# Patient Record
Sex: FEMALE | Race: WHITE | URBAN - METROPOLITAN AREA
[De-identification: names, ages, dates, MRNs, and addresses within clinical notes are randomized per-mention and may not be internally consistent; named-entity substitution may affect disease eponyms.]

---

## 2018-03-01 ENCOUNTER — HOSPITAL ENCOUNTER (OUTPATIENT)
Dept: OTHER | Age: 43
Discharge: HOME | End: 2018-03-01
Attending: FAMILY MEDICINE | Admitting: CLINIC/CENTER

## 2018-03-03 LAB
CULTURE: NORMAL
REPORT STATUS: NORMAL
REQUEST PROBLEM: NORMAL
SPECIMEN: NORMAL

## 2021-01-09 ENCOUNTER — OFFICE VISIT (OUTPATIENT)
Dept: FAMILY MEDICINE CLINIC | Age: 46
End: 2021-01-09
Payer: COMMERCIAL

## 2021-01-09 VITALS
WEIGHT: 140 LBS | HEART RATE: 77 BPM | DIASTOLIC BLOOD PRESSURE: 76 MMHG | HEIGHT: 62 IN | TEMPERATURE: 97.2 F | OXYGEN SATURATION: 99 % | SYSTOLIC BLOOD PRESSURE: 124 MMHG | RESPIRATION RATE: 16 BRPM | BODY MASS INDEX: 25.76 KG/M2

## 2021-01-09 DIAGNOSIS — J06.9 UPPER RESPIRATORY TRACT INFECTION, UNSPECIFIED TYPE: Primary | ICD-10-CM

## 2021-01-09 PROCEDURE — 99213 OFFICE O/P EST LOW 20 MIN: CPT | Performed by: NURSE PRACTITIONER

## 2021-01-09 RX ORDER — AZITHROMYCIN 250 MG/1
250 TABLET, FILM COATED ORAL DAILY
Qty: 10 TABLET | Refills: 0 | Status: SHIPPED | OUTPATIENT
Start: 2021-01-09 | End: 2021-01-19

## 2021-01-09 RX ORDER — FLUTICASONE PROPIONATE 50 MCG
1 SPRAY, SUSPENSION (ML) NASAL DAILY
Qty: 1 BOTTLE | Refills: 0 | Status: SHIPPED | OUTPATIENT
Start: 2021-01-09

## 2021-01-09 RX ORDER — LORATADINE AND PSEUDOEPHEDRINE SULFATE 10; 240 MG/1; MG/1
1 TABLET, EXTENDED RELEASE ORAL DAILY
Qty: 30 TABLET | Refills: 0 | Status: SHIPPED | OUTPATIENT
Start: 2021-01-09 | End: 2021-02-08

## 2021-01-09 RX ORDER — DESOGESTREL AND ETHINYL ESTRADIOL 21-5 (28)
KIT ORAL
COMMUNITY
Start: 2020-12-16 | End: 2021-08-11 | Stop reason: SDUPTHER

## 2021-01-09 NOTE — PROGRESS NOTES
Subjective:       History was provided by the patient. Merrill Garcia is a 50 y.o. female who presents for evaluation of symptoms of a URI. Symptoms include nasal blockage, post nasal drip, sinus and nasal congestion and sore throat. Onset of symptoms was 5 days ago, unchanged since that time. Associated symptoms include congestion, nasal congestion, no  fever, post nasal drip and sore throat. She is drinking plenty of fluids. Evaluation to date: none. Treatment to date: none  Past Medical History:   Diagnosis Date    Broken nose     Headache      There are no active problems to display for this patient. No past surgical history on file. Review of Systems  Pertinent items are noted in HPI. Objective:      General appearance: alert, appears stated age and cooperative  Head: Normocephalic, without obvious abnormality, atraumatic  Eyes: conjunctivae/corneas clear. PERRL, EOM's intact. Fundi benign. Ears: normal TM's and external ear canals both ears  Nose: Nares normal. Septum midline. Mucosa normal. No drainage or sinus tenderness. Throat: abnormal findings: marked oropharyngeal erythema  Lungs: clear to auscultation bilaterally  Lymph nodes: Cervical, supraclavicular, and axillary nodes normal.         Assessment:      viral upper respiratory illness      Plan:      Discussed dx and tx of URIs  Suggested symptomatic OTC remedies. Suggested brief course of Afrin or similar. Nasal saline sprays for congestion. Nasal steroids per orders. RTC prn.      1. Upper respiratory tract infection, unspecified type    Pt feels that she constantly has to clear her throat. Has tried no treatment to date. She states that her nose feels sticky, is unable to cough anything up.    - azithromycin (ZITHROMAX) 250 MG tablet; Take 1 tablet by mouth daily for 10 days  Dispense: 10 tablet; Refill: 0  - fluticasone (FLONASE) 50 MCG/ACT nasal spray; 1 spray by Each Nostril route daily  Dispense: 1 Bottle;  Refill: 0  - loratadine-pseudoephedrine (CLARITIN-D 24 HOUR)  MG per extended release tablet; Take 1 tablet by mouth daily  Dispense: 30 tablet;  Refill: 0

## 2021-01-09 NOTE — PATIENT INSTRUCTIONS
Thank you for enrolling in [address] Please follow the instructions below to securely access your online medical record. [employer] allows you to send messages to your doctor, view your test results, renew your prescriptions, schedule appointments, and more. How Do I Sign Up? 1. In your Internet browser, go to [email or web address]  2. Click on the Sign Up Now link in the Sign In box. You will see the New Member Sign Up page. 3. Enter your [employer] Access Code exactly as it appears below. You will not need to use this code after youve completed the sign-up process. If you do not sign up before the expiration date, you must request a new code. [employer] Access Code: Activation code not generated  Current [employer] Status: Patient Declined    4. Enter your Social Security Number ([id number]) and Date of Birth (mm/dd/yyyy) as indicated and click Submit. You will be taken to the next sign-up page. 5. Create a [employer] ID. This will be your [employer] login ID and cannot be changed, so think of one that is secure and easy to remember. 6. Create a [employer] password. You can change your password at any time. 7. Enter your Password Reset Question and Answer. This can be used at a later time if you forget your password. 8. Enter your e-mail address. You will receive e-mail notification when new information is available in [address] 9. Click Sign Up. You can now view your medical record. Additional Information  If you have questions, please contact the physician practice where you receive care. Remember, [employer] is NOT to be used for urgent needs. For medical emergencies, dial 911. For questions regarding your [employer] account call [phone]. If you have a clinical question, please call your doctor's office.

## 2021-08-11 DIAGNOSIS — J06.9 UPPER RESPIRATORY TRACT INFECTION, UNSPECIFIED TYPE: ICD-10-CM

## 2021-08-11 RX ORDER — DESOGESTREL AND ETHINYL ESTRADIOL 21-5 (28)
1 KIT ORAL DAILY
Qty: 1 PACKET | Refills: 0 | Status: SHIPPED | OUTPATIENT
Start: 2021-08-11 | End: 2021-09-03 | Stop reason: SDUPTHER

## 2021-09-03 ENCOUNTER — OFFICE VISIT (OUTPATIENT)
Dept: OBGYN | Age: 46
End: 2021-09-03
Payer: COMMERCIAL

## 2021-09-03 ENCOUNTER — HOSPITAL ENCOUNTER (OUTPATIENT)
Age: 46
Setting detail: SPECIMEN
Discharge: HOME | End: 2021-09-03
Payer: COMMERCIAL

## 2021-09-03 VITALS — BODY MASS INDEX: 23.92 KG/M2 | WEIGHT: 130 LBS | HEIGHT: 62 IN

## 2021-09-03 DIAGNOSIS — Z12.31 ENCOUNTER FOR SCREENING MAMMOGRAM FOR MALIGNANT NEOPLASM OF BREAST: ICD-10-CM

## 2021-09-03 DIAGNOSIS — J06.9 UPPER RESPIRATORY TRACT INFECTION, UNSPECIFIED TYPE: ICD-10-CM

## 2021-09-03 DIAGNOSIS — Z80.41 FAMILY HISTORY OF OVARIAN CANCER: ICD-10-CM

## 2021-09-03 DIAGNOSIS — Z01.419 WOMEN'S ANNUAL ROUTINE GYNECOLOGICAL EXAMINATION: Primary | ICD-10-CM

## 2021-09-03 DIAGNOSIS — Z87.42 HISTORY OF ABNORMAL CERVICAL PAP SMEAR: ICD-10-CM

## 2021-09-03 PROCEDURE — 99396 PREV VISIT EST AGE 40-64: CPT | Performed by: NURSE PRACTITIONER

## 2021-09-03 PROCEDURE — 87624 HPV HI-RISK TYP POOLED RSLT: CPT

## 2021-09-03 PROCEDURE — 88142 CYTOPATH C/V THIN LAYER: CPT

## 2021-09-03 RX ORDER — DESOGESTREL AND ETHINYL ESTRADIOL 21-5 (28)
1 KIT ORAL DAILY
Qty: 1 PACKET | Refills: 11 | Status: SHIPPED | OUTPATIENT
Start: 2021-09-03 | End: 2022-09-07 | Stop reason: SDUPTHER

## 2021-09-03 SDOH — ECONOMIC STABILITY: FOOD INSECURITY: WITHIN THE PAST 12 MONTHS, THE FOOD YOU BOUGHT JUST DIDN'T LAST AND YOU DIDN'T HAVE MONEY TO GET MORE.: NEVER TRUE

## 2021-09-03 SDOH — ECONOMIC STABILITY: FOOD INSECURITY: WITHIN THE PAST 12 MONTHS, YOU WORRIED THAT YOUR FOOD WOULD RUN OUT BEFORE YOU GOT MONEY TO BUY MORE.: NEVER TRUE

## 2021-09-03 ASSESSMENT — PATIENT HEALTH QUESTIONNAIRE - PHQ9
SUM OF ALL RESPONSES TO PHQ QUESTIONS 1-9: 0
SUM OF ALL RESPONSES TO PHQ QUESTIONS 1-9: 0
SUM OF ALL RESPONSES TO PHQ9 QUESTIONS 1 & 2: 0
SUM OF ALL RESPONSES TO PHQ QUESTIONS 1-9: 0
2. FEELING DOWN, DEPRESSED OR HOPELESS: 0
1. LITTLE INTEREST OR PLEASURE IN DOING THINGS: 0

## 2021-09-03 ASSESSMENT — ENCOUNTER SYMPTOMS
GASTROINTESTINAL NEGATIVE: 1
RESPIRATORY NEGATIVE: 1

## 2021-09-03 ASSESSMENT — SOCIAL DETERMINANTS OF HEALTH (SDOH): HOW HARD IS IT FOR YOU TO PAY FOR THE VERY BASICS LIKE FOOD, HOUSING, MEDICAL CARE, AND HEATING?: NOT HARD AT ALL

## 2021-09-03 NOTE — PROGRESS NOTES
9/3/21    Lizet Barba  1975    No chief complaint on file. The patient is a 48 y.o. female, No obstetric history on file. who presents for her annual exam.  She is menstruating normally. She is  sexually active. She is currently taking birth control. Birth control method is oral contraceptive. She reports no gynecological symptoms. Pap smear history: Her last PAP smear was in 10/27/20. Her results were normal.    Breast history: her most recent mammogram was in \"years ago\". The results were: Normal per patient    Past Medical History:   Diagnosis Date    Broken nose     Headache        No past surgical history on file. No family history on file. Social History     Tobacco Use    Smoking status: Never Smoker    Smokeless tobacco: Never Used   Substance Use Topics    Alcohol use: No    Drug use: No       Current Outpatient Medications   Medication Sig Dispense Refill    VOLNEA 0.15-0.02/0.01 MG (21/5) per tablet Take 1 tablet by mouth daily 1 packet 0    fluticasone (FLONASE) 50 MCG/ACT nasal spray 1 spray by Each Nostril route daily 1 Bottle 0     No current facility-administered medications for this visit. No Known Allergies    No obstetric history on file. There is no immunization history on file for this patient. Review of Systems   Constitutional: Negative. Respiratory: Negative. Gastrointestinal: Negative. Genitourinary: Negative. There were no vitals taken for this visit. Physical Exam  Vitals reviewed. Constitutional:       Appearance: Normal appearance. HENT:      Head: Normocephalic. Pulmonary:      Effort: Pulmonary effort is normal.   Chest:      Breasts:         Right: Normal.         Left: Normal.   Abdominal:      Palpations: Abdomen is soft. Genitourinary:     General: Normal vulva.       Vagina: Normal.      Cervix: Normal.      Uterus: Normal.       Adnexa: Right adnexa normal and left adnexa normal.      Rectum: Normal.   Musculoskeletal:         General: Normal range of motion. Cervical back: Normal range of motion. Skin:     General: Skin is warm and dry. Neurological:      Mental Status: She is alert. Psychiatric:         Mood and Affect: Mood normal.         Behavior: Behavior normal.         No results found for this visit on 09/03/21. Assessment and Plan   Diagnosis Orders   1. Women's annual routine gynecological examination     2. Encounter for screening mammogram for malignant neoplasm of breast       /68  2/25/19 LGSIL + HPV, [place] 3/17/20, Cryo 3/31/20  Family history of ovarian cancer; mother age 28. Info given on genetic counseling. No follow-ups on file.     Grady Dear, APRN - CNP

## 2021-09-10 LAB
HPV HIGH RISK: NOT DETECTED
HPV, GENOTYPE 16: NOT DETECTED
HPV, GENOTYPE 18: NOT DETECTED

## 2022-05-06 ENCOUNTER — OFFICE VISIT (OUTPATIENT)
Dept: FAMILY MEDICINE CLINIC | Age: 47
End: 2022-05-06
Payer: COMMERCIAL

## 2022-05-06 VITALS
TEMPERATURE: 97.3 F | HEART RATE: 70 BPM | RESPIRATION RATE: 14 BRPM | DIASTOLIC BLOOD PRESSURE: 72 MMHG | SYSTOLIC BLOOD PRESSURE: 120 MMHG | WEIGHT: 132.2 LBS | BODY MASS INDEX: 24.18 KG/M2 | OXYGEN SATURATION: 99 %

## 2022-05-06 DIAGNOSIS — N31.9 BLADDER DYSFUNCTION: ICD-10-CM

## 2022-05-06 DIAGNOSIS — Z32.02 PREGNANCY EXAMINATION OR TEST, NEGATIVE RESULT: ICD-10-CM

## 2022-05-06 DIAGNOSIS — N89.8 VAGINAL DISCHARGE: ICD-10-CM

## 2022-05-06 DIAGNOSIS — B37.31 VAGINAL YEAST INFECTION: Primary | ICD-10-CM

## 2022-05-06 LAB
CONTROL: NORMAL
PREGNANCY TEST URINE, POC: NEGATIVE

## 2022-05-06 PROCEDURE — 81025 URINE PREGNANCY TEST: CPT | Performed by: FAMILY MEDICINE

## 2022-05-06 PROCEDURE — 99204 OFFICE O/P NEW MOD 45 MIN: CPT | Performed by: FAMILY MEDICINE

## 2022-05-06 RX ORDER — OXYBUTYNIN CHLORIDE 5 MG/1
10 TABLET, EXTENDED RELEASE ORAL DAILY
Qty: 60 TABLET | Refills: 0 | Status: SHIPPED | OUTPATIENT
Start: 2022-05-06 | End: 2022-06-02

## 2022-05-06 RX ORDER — CEPHALEXIN 500 MG/1
500 CAPSULE ORAL 2 TIMES DAILY
COMMUNITY
Start: 2022-04-30

## 2022-05-06 RX ORDER — FLUCONAZOLE 150 MG/1
150 TABLET ORAL ONCE
Qty: 1 TABLET | Refills: 0 | Status: SHIPPED | OUTPATIENT
Start: 2022-05-06 | End: 2022-05-06

## 2022-05-06 ASSESSMENT — PATIENT HEALTH QUESTIONNAIRE - PHQ9
SUM OF ALL RESPONSES TO PHQ QUESTIONS 1-9: 0
SUM OF ALL RESPONSES TO PHQ QUESTIONS 1-9: 0
1. LITTLE INTEREST OR PLEASURE IN DOING THINGS: 0
SUM OF ALL RESPONSES TO PHQ QUESTIONS 1-9: 0
SUM OF ALL RESPONSES TO PHQ9 QUESTIONS 1 & 2: 0
2. FEELING DOWN, DEPRESSED OR HOPELESS: 0
SUM OF ALL RESPONSES TO PHQ QUESTIONS 1-9: 0

## 2022-05-06 NOTE — PROGRESS NOTES
[facility]  [address]  [facility] [place] [address]  Dept: [phone]  Loc: [phone]        ASSESSMENT/PLAN:    1. Vaginal yeast infection  -     fluconazole (DIFLUCAN) 150 MG tablet; Take 1 tablet by mouth once for 1 dose, Disp-1 tablet, R-0Normal  -     C.trachomatis N.gonorrhoeae DNA; Future  2. Vaginal discharge  -     LIPID PANEL; Future  -     RPR REFLEX; Future  3. Bladder dysfunction  -     oxybutynin (DITROPAN XL) 5 MG extended release tablet; Take 2 tablets by mouth daily, Disp-60 tablet, R-0Normal  Reports abnormal white vaginal discharge after starting Keflex. Patient is agreeable to get lab work done and take medication as prescribed  Additionally, , patient reports some episodes of incontinence. Patient reports that she feels a lot of pressure and sometimes unable to hold her urine. Patient denies any work-up in the past    Return if symptoms worsen or fail to improve. Patient's Name: Justice Parmar   YOB: 1975   Age: 48 y.o. Date of service: 5/6/2022    Chief Complaint:   Chief Complaint   Patient presents with   [address]     patient is here to establish care     Incontinence     having issues with incontinence she is currently being treated for a UTI    Discuss Labs     would like to have a lipid panel     Yeast Infection     having vaginal itching since she started taking the cephalexin having some swelling         Patient ID: Justice Parmar is a 48 y.o. female.    Chief Complaint   Patient presents with   [address]     patient is here to establish care     Incontinence     having issues with incontinence she is currently being treated for a UTI    Discuss Labs     would like to have a lipid panel     Yeast Infection     having vaginal itching since she started taking the cephalexin having some swelling        HPI    Patient is a 68-year-old female with no  Smokeless tobacco: Never Used   Substance and Sexual Activity    Alcohol use: No    Drug use: No    Sexual activity: Not on file   Other Topics Concern    Not on file   Social History Narrative    Not on file     Social Determinants of Health     Financial Resource Strain: Low Risk     Difficulty of Paying Living Expenses: Not hard at all   Food Insecurity: No Food Insecurity    Worried About Running Out of Food in the Last Year: Never true    Curtis of Food in the Last Year: Never true   Transportation Needs:     Lack of Transportation (Medical): Not on file    Lack of Transportation (Non-Medical): Not on file   Physical Activity:     Days of Exercise per Week: Not on file    Minutes of Exercise per Session: Not on file   Stress:     Feeling of Stress : Not on file   Social Connections:     Frequency of Communication with Friends and Family: Not on file    Frequency of Social Gatherings with Friends and Family: Not on file    Attends [employer] Services: Not on file    Active Member of [address] [place] [address] or Organizations: Not on file    Attends Club or Organization Meetings: Not on file    Marital Status: Not on file   Intimate Partner Violence:     Fear of Current or Ex-Partner: Not on file    Emotionally Abused: Not on file    Physically Abused: Not on file    Sexually Abused: Not on file   Housing Stability:     Unable to Pay for Housing in the Last Year: Not on file    Number of [place] in the Last Year: Not on file    Unstable Housing in the Last Year: Not on file       There is no immunization history on file for this patient.   No Known Allergies  Family History   Problem Relation Age of Onset    Ovarian Cancer Mother          Current Outpatient Medications   Medication Sig Dispense Refill    cephALEXin (KEFLEX) 500 MG capsule Take 500 mg by mouth 2 times daily      fluconazole (DIFLUCAN) 150 MG tablet Take 1 tablet by mouth once for 1 dose 1 tablet 0    oxybutynin (DITROPAN XL) 5 MG extended release tablet Take 2 tablets by mouth daily 60 tablet 0    VOLNEA 0.15-0.02/0.01 MG (21/5) per tablet Take 1 tablet by mouth daily 1 packet 11    fluticasone (FLONASE) 50 MCG/ACT nasal spray 1 spray by Each Nostril route daily (Patient not taking: Reported on 9/3/2021) 1 Bottle 0     No current facility-administered medications for this visit. Objective:  Vitals:  Vitals:    05/06/22 1035   BP: 120/72   Pulse: 70   Resp: 14   Temp: 97.3 °F (36.3 °C)   SpO2: 99%      BP Readings from Last 4 Encounters:   05/06/22 120/72   01/09/21 124/76   03/17/18 124/78   10/11/17 117/73      Body mass index is 24.18 kg/m². All questions answered to patient's satisfaction. The patient was counseled regarding impressions, instructions for management and importance of compliance with treatment. Return if symptoms worsen or fail to improve.     Nolan Strickland MD

## 2022-05-07 LAB
C. TRACHOMATIS DNA ,URINE: NEGATIVE
CANDIDA SPECIES, DNA PROBE: ABNORMAL
CHOLESTEROL, TOTAL: 312 MG/DL (ref 0–199)
GARDNERELLA VAGINALIS, DNA PROBE: ABNORMAL
HDLC SERPL-MCNC: 73 MG/DL (ref 40–60)
LDL CHOLESTEROL CALCULATED: 198 MG/DL
N. GONORRHOEAE DNA, URINE: NEGATIVE
TOTAL SYPHILLIS IGG/IGM: NORMAL
TRICHOMONAS VAGINALIS DNA: ABNORMAL
TRIGL SERPL-MCNC: 207 MG/DL (ref 0–150)
VLDLC SERPL CALC-MCNC: 41 MG/DL

## 2022-05-10 DIAGNOSIS — R53.83 OTHER FATIGUE: Primary | ICD-10-CM

## 2022-05-10 RX ORDER — FLUCONAZOLE 150 MG/1
150 TABLET ORAL ONCE
Qty: 1 TABLET | Refills: 0 | Status: SHIPPED | OUTPATIENT
Start: 2022-05-10 | End: 2022-05-10

## 2022-05-10 RX ORDER — ATORVASTATIN CALCIUM 40 MG/1
40 TABLET, FILM COATED ORAL DAILY
Qty: 30 TABLET | Refills: 3 | Status: SHIPPED | OUTPATIENT
Start: 2022-05-10 | End: 2022-09-12

## 2022-06-01 DIAGNOSIS — N31.9 BLADDER DYSFUNCTION: ICD-10-CM

## 2022-06-02 RX ORDER — OXYBUTYNIN CHLORIDE 5 MG/1
TABLET, EXTENDED RELEASE ORAL
Qty: 60 TABLET | Refills: 0 | Status: SHIPPED | OUTPATIENT
Start: 2022-06-02

## 2022-09-07 DIAGNOSIS — J06.9 UPPER RESPIRATORY TRACT INFECTION, UNSPECIFIED TYPE: ICD-10-CM

## 2022-09-07 RX ORDER — DESOGESTREL AND ETHINYL ESTRADIOL 21-5 (28)
1 KIT ORAL DAILY
Qty: 1 PACKET | Refills: 0 | Status: SHIPPED | OUTPATIENT
Start: 2022-09-07 | End: 2022-10-07

## 2022-09-09 DIAGNOSIS — J06.9 UPPER RESPIRATORY TRACT INFECTION, UNSPECIFIED TYPE: ICD-10-CM

## 2022-09-09 RX ORDER — DESOGESTREL AND ETHINYL ESTRADIOL 21-5 (28)
KIT ORAL
Qty: 28 TABLET | OUTPATIENT
Start: 2022-09-09

## 2022-09-12 RX ORDER — ATORVASTATIN CALCIUM 40 MG/1
TABLET, FILM COATED ORAL
Qty: 30 TABLET | Refills: 3 | Status: SHIPPED | OUTPATIENT
Start: 2022-09-12

## 2022-10-07 DIAGNOSIS — J06.9 UPPER RESPIRATORY TRACT INFECTION, UNSPECIFIED TYPE: ICD-10-CM

## 2022-10-07 RX ORDER — DESOGESTREL AND ETHINYL ESTRADIOL 21-5 (28)
KIT ORAL
Qty: 28 TABLET | Refills: 0 | Status: SHIPPED | OUTPATIENT
Start: 2022-10-07 | End: 2022-11-02

## 2022-11-02 ENCOUNTER — TELEPHONE (OUTPATIENT)
Dept: OBGYN | Age: 47
End: 2022-11-02

## 2022-11-02 RX ORDER — DESOGESTREL AND ETHINYL ESTRADIOL 21-5 (28)
1 KIT ORAL DAILY
Qty: 1 PACKET | Refills: 0 | Status: SHIPPED | OUTPATIENT
Start: 2022-11-02 | End: 2022-11-22 | Stop reason: SDUPTHER

## 2022-11-02 NOTE — TELEPHONE ENCOUNTER
Pt had to reschedule her annual, needs one more month of Volnea 0.15-0.02/0.01 (21/5)  Annual scheduled 11/17/2022

## 2022-11-04 RX ORDER — DESOGESTREL AND ETHINYL ESTRADIOL 21-5 (28)
KIT ORAL
Qty: 28 TABLET | OUTPATIENT
Start: 2022-11-04

## 2022-11-22 ENCOUNTER — OFFICE VISIT (OUTPATIENT)
Dept: OBGYN | Age: 47
End: 2022-11-22
Payer: COMMERCIAL

## 2022-11-22 DIAGNOSIS — N39.498 OTHER URINARY INCONTINENCE: ICD-10-CM

## 2022-11-22 DIAGNOSIS — Z01.419 WOMEN'S ANNUAL ROUTINE GYNECOLOGICAL EXAMINATION: Primary | ICD-10-CM

## 2022-11-22 DIAGNOSIS — Z12.31 ENCOUNTER FOR SCREENING MAMMOGRAM FOR MALIGNANT NEOPLASM OF BREAST: ICD-10-CM

## 2022-11-22 DIAGNOSIS — Z87.42 HISTORY OF DYSMENORRHEA: ICD-10-CM

## 2022-11-22 PROCEDURE — 99396 PREV VISIT EST AGE 40-64: CPT

## 2022-11-22 RX ORDER — DESOGESTREL AND ETHINYL ESTRADIOL 21-5 (28)
1 KIT ORAL DAILY
Qty: 3 PACKET | Refills: 3 | Status: SHIPPED | OUTPATIENT
Start: 2022-11-22

## 2022-11-22 ASSESSMENT — ENCOUNTER SYMPTOMS
ABDOMINAL PAIN: 0
RESPIRATORY NEGATIVE: 1
GASTROINTESTINAL NEGATIVE: 1

## 2022-11-22 NOTE — PROGRESS NOTES
22    Leigh Lott  1975    Chief Complaint   Patient presents with    Annual Exam     Pap 9/3/2021- Mammo-unknown. Taking bc. Needs refills. Pt c/o incontinence. The patient is a 50 y.o. female,  who presents for her annual exam.  She is menstruating normally. She is  sexually active. She is currently taking birth control. Birth control method is oral contraceptive. She reports additional symptoms of urinary incontinence . Pt states symptoms have been mildly present for years but have progressively worsened over about the last year. Pap smear history: Her last PAP smear was in . Her results were normal.    Breast history: her most recent mammogram was in unknown. The results were: Normal    Past Medical History:   Diagnosis Date    Abnormal Pap smear of cervix     LGSIL 3/17/2020, had cryo 3/21/20    Broken nose     Headache     migraine       Past Surgical History:   Procedure Laterality Date    GYNECOLOGIC CRYOSURGERY      NASAL SEPTUM SURGERY         Family History   Problem Relation Age of Onset    Ovarian Cancer Mother        Social History     Tobacco Use    Smoking status: Never    Smokeless tobacco: Never   Substance Use Topics    Alcohol use: No    Drug use: No       Current Outpatient Medications   Medication Sig Dispense Refill    desogestrel-ethinyl estradiol (VOLNEA) 0.15-0.02/0.01 MG () per tablet Take 1 tablet by mouth daily 3 packet 3    atorvastatin (LIPITOR) 40 MG tablet TAKE ONE TABLET BY MOUTH DAILY 30 tablet 3    oxybutynin (DITROPAN-XL) 5 MG extended release tablet TAKE TWO TABLETS BY MOUTH DAILY 60 tablet 0    cephALEXin (KEFLEX) 500 MG capsule Take 500 mg by mouth 2 times daily (Patient not taking: Reported on 2022)      fluticasone (FLONASE) 50 MCG/ACT nasal spray 1 spray by Each Nostril route daily (Patient not taking: No sig reported) 1 Bottle 0     No current facility-administered medications for this visit.        No Known Allergies          There is no immunization history on file for this patient. Review of Systems   Constitutional: Negative. Negative for fatigue and fever. Respiratory: Negative. Gastrointestinal: Negative. Negative for abdominal pain. Endocrine: Negative. Genitourinary: Negative. Negative for dysuria, frequency, menstrual problem, vaginal bleeding, vaginal discharge and vaginal pain. Musculoskeletal: Negative. Skin: Negative. Neurological: Negative. Negative for dizziness and headaches. Psychiatric/Behavioral: Negative. There were no vitals taken for this visit. Physical Exam  Vitals and nursing note reviewed. Constitutional:       General: She is not in acute distress. Appearance: Normal appearance. She is normal weight. HENT:      Head: Normocephalic and atraumatic. Pulmonary:      Effort: Pulmonary effort is normal. No respiratory distress. Chest:   Breasts:     Right: Normal.      Left: Normal.   Abdominal:      Palpations: Abdomen is soft. Tenderness: There is no abdominal tenderness. Genitourinary:     General: Normal vulva. Exam position: Lithotomy position. Vagina: Normal.      Cervix: Normal.      Uterus: Normal.       Adnexa: Right adnexa normal and left adnexa normal.   Musculoskeletal:         General: Normal range of motion. Cervical back: Normal range of motion. Lymphadenopathy:      Upper Body:      Right upper body: No supraclavicular or axillary adenopathy. Left upper body: No supraclavicular or axillary adenopathy. Skin:     General: Skin is warm and dry. Findings: No rash. Neurological:      General: No focal deficit present. Mental Status: She is alert and oriented to person, place, and time. Psychiatric:         Mood and Affect: Mood normal.         Behavior: Behavior normal.         Thought Content: Thought content normal.       No results found for this visit on 22.     Assessment and Plan Diagnosis Orders   1. Women's annual routine gynecological examination  WILNER DIGITAL SCREEN W OR WO CAD BILATERAL      2. Other urinary incontinence  External Referral To Physical Therapy      3. Encounter for screening mammogram for malignant neoplasm of breast  [facility] DIGITAL SCREEN W OR WO CAD BILATERAL      4. History of dysmenorrhea  desogestrel-ethinyl estradiol (VOLNEA) 0.15-0.02/0.01 MG (21/5) per tablet        Mammo order, pelvic floor PT discussed and external referral placed, refill OCP    Pt encouraged to call/return if urinary issues persist, reviewed possibility of medication trial at some point in the future as well    No other issues today    Return if symptoms worsen or fail to improve.     Caryle Sprung, PA-C

## 2022-11-27 DIAGNOSIS — Z87.42 HISTORY OF DYSMENORRHEA: ICD-10-CM

## 2022-11-28 RX ORDER — DESOGESTREL AND ETHINYL ESTRADIOL 21-5 (28)
KIT ORAL
Qty: 28 TABLET | OUTPATIENT
Start: 2022-11-28

## 2022-12-12 ENCOUNTER — OFFICE VISIT (OUTPATIENT)
Dept: INTERNAL MEDICINE CLINIC | Age: 47
End: 2022-12-12
Payer: COMMERCIAL

## 2022-12-12 VITALS
BODY MASS INDEX: 27.05 KG/M2 | TEMPERATURE: 97.5 F | RESPIRATION RATE: 16 BRPM | SYSTOLIC BLOOD PRESSURE: 118 MMHG | HEART RATE: 76 BPM | WEIGHT: 147 LBS | HEIGHT: 62 IN | DIASTOLIC BLOOD PRESSURE: 78 MMHG | OXYGEN SATURATION: 99 %

## 2022-12-12 DIAGNOSIS — R07.9 CHEST PAIN, UNSPECIFIED TYPE: Primary | ICD-10-CM

## 2022-12-12 DIAGNOSIS — Z30.41 FAMILY PLANNING, BCP (BIRTH CONTROL PILLS) MAINTENANCE: ICD-10-CM

## 2022-12-12 DIAGNOSIS — N39.41 URGE INCONTINENCE: ICD-10-CM

## 2022-12-12 DIAGNOSIS — R53.82 CHRONIC FATIGUE: ICD-10-CM

## 2022-12-12 DIAGNOSIS — E78.2 MIXED HYPERLIPIDEMIA: ICD-10-CM

## 2022-12-12 DIAGNOSIS — N39.46 MIXED INCONTINENCE: ICD-10-CM

## 2022-12-12 PROCEDURE — 99203 OFFICE O/P NEW LOW 30 MIN: CPT | Performed by: INTERNAL MEDICINE

## 2022-12-12 RX ORDER — BUTALBITAL, ACETAMINOPHEN AND CAFFEINE 300; 40; 50 MG/1; MG/1; MG/1
CAPSULE ORAL
COMMUNITY
Start: 2022-11-01

## 2022-12-12 ASSESSMENT — ENCOUNTER SYMPTOMS
GASTROINTESTINAL NEGATIVE: 1
WHEEZING: 0
COUGH: 0
ALLERGIC/IMMUNOLOGIC NEGATIVE: 1
SHORTNESS OF BREATH: 0
EYES NEGATIVE: 1
RESPIRATORY NEGATIVE: 1

## 2022-12-12 NOTE — PROGRESS NOTES
Alicia Samayoa  Patient's  is 1975  Seen in office on 2022      SUBJECTIVE:  Lyle Mckeon angie 50 y. o.year old female presents today   Chief Complaint   Patient presents with    New Patient     Establish care and concerns with sharp chest pain    Hyperlipidemia     Concerns with cholesterol and wonders if medication is working    Fatigue     Feeling fatigued daily. Sleeps between 6-7 hours a night with disrupted sleep due to weak bladder. Denies difficulty falling asleep     New patient here to establish    Patient complaining of sharp chest pain on the left side lasting only 1 to 2 seconds. No shortness of breath with that. No cough or sputum production. No fever or chills    Patient gives a history of very high lipids. Cholesterol was 300 and and the LDL was 192. Patient was started on atorvastatin 40 mg daily by previous doctor but no follow-up blood tests were done here. Patient denies any abdominal pain. No myalgias    Complains of fatigue for the last 1 year. Dr Kasia Goodwin had ordered iron studies and CBC and TSH but patient did not get that done    She also has urinary incontinence she has urge incontinence and also stress incontinence when she coughs or sneezes. Taking medications regularly. No side effects noted. Review of Systems   Constitutional:  Positive for fatigue. Negative for chills, diaphoresis and fever. HENT: Negative. Eyes: Negative. Respiratory: Negative. Negative for cough, shortness of breath and wheezing. Cardiovascular:  Negative for palpitations and leg swelling. Gastrointestinal: Negative. Endocrine: Negative. Genitourinary:  Positive for urgency. Musculoskeletal: Negative. Allergic/Immunologic: Negative. Neurological: Negative. Hematological: Negative. Psychiatric/Behavioral: Negative.        OBJECTIVE: /78 (Site: Left Upper Arm, Position: Sitting, Cuff Size: Medium Adult)   Pulse 76   Temp 97.5 °F (36.4 °C) (Temporal)   Resp 16 Ht 5' 2\" (1.575 m)   Wt 147 lb (66.7 kg)   SpO2 99%   BMI 26.89 kg/m²     Wt Readings from Last 3 Encounters:   12/12/22 147 lb (66.7 kg)   05/06/22 132 lb 3.2 oz (60 kg)   09/03/21 130 lb (59 kg)      GENERAL: - Alert, oriented, pleasant, in no apparent distress. HEENT: - Conjunctiva pink, no scleral icterus. ENT clear. NECK: -Supple. No jugular venous distention noted. No masses felt,  CARDIOVASCULAR: - Normal S1 and S2    PULMONARY: - No respiratory distress. No wheezes or rales. ABDOMEN: - Soft and non-tender,no masses  ororganomegaly. EXTREMITIES: - No cyanosis, clubbing, or significant edema. SKIN: Skin is warm and dry. NEUROLOGICAL: - Cranial nerves II through XII are grossly intact. IMPRESSION:    Encounter Diagnoses   Name Primary? Mixed hyperlipidemia     Chronic fatigue     Urge incontinence     Mixed incontinence     Family planning, BCP (birth control pills) maintenance        ASSESSMENT/PLAN:    1. Chest pain, unspecified type   Sharp chest pain lasting 1 to 2 seconds. Examination normal   Will get a chest x-ray    -     XR CHEST (2 VW); Future  2. Mixed hyperlipidemia  Overview:  Pt has HLD since teenage   Chol 312 recently   On atorvastatin 40 mg daily   Orders:  -     Lipid, Fasting  -     Vitamin D 25 Hydroxy; Future  3. Chronic fatigue  Overview:  Fatigue for one year   Orders:  -     CBC with Auto Differential  -     Lipid, Fasting  -     Comprehensive Metabolic Panel  -     TSH  4. Mixed incontinence. Continue oxybutynin    6. Family planning, BCP (birth control pills) maintenance    Orders Placed This Encounter   Procedures    XR CHEST (2 VW)    CBC with Auto Differential    Lipid, Fasting    Comprehensive Metabolic Panel    TSH    Vitamin D 25 Hydroxy       Mediations reviewed with the patient. Continue current medications. Appropriate prescriptions are addressed. After visit summeryprovided. Follow up as directed sooner if needed.   Questions answered and patient verbalizes understanding. Call for any problems, questions, or concerns. No Known Allergies  Current Outpatient Medications   Medication Sig Dispense Refill    butalbital-APAP-caffeine -40 MG CAPS per capsule       desogestrel-ethinyl estradiol (VOLNEA) 0.15-0.02/0.01 MG (21/5) per tablet Take 1 tablet by mouth daily 3 packet 3    atorvastatin (LIPITOR) 40 MG tablet TAKE ONE TABLET BY MOUTH DAILY 30 tablet 3    oxybutynin (DITROPAN-XL) 5 MG extended release tablet TAKE TWO TABLETS BY MOUTH DAILY 60 tablet 0     No current facility-administered medications for this visit.      Past Medical History:   Diagnosis Date    Abnormal Pap smear of cervix     LGSIL 3/17/2020, had cryo 3/21/20    Broken nose     Headache     migraine    Urinary incontinence      Past Surgical History:   Procedure Laterality Date    GYNECOLOGIC CRYOSURGERY      NASAL SEPTUM SURGERY       Social History     Tobacco Use    Smoking status: Never    Smokeless tobacco: Never   Substance Use Topics    Alcohol use: No       LAB REVIEW:  CBC:   Lab Results   Component Value Date/Time    WBC 11.4 10/11/2017 02:15 PM    HGB 13.3 10/11/2017 02:15 PM    HCT 39.9 10/11/2017 02:15 PM     10/11/2017 02:15 PM     Lipids:   Lab Results   Component Value Date    HDL 73 (H) 05/06/2022    LDLCALC 198 (H) 05/06/2022     Renal:   Lab Results   Component Value Date/Time    BUN 6 10/11/2017 02:15 PM    CREATININE 0.6 10/11/2017 02:15 PM     10/11/2017 02:15 PM    K 3.8 10/11/2017 02:15 PM    ALT 44 10/11/2017 02:15 PM    AST 28 10/11/2017 02:15 PM    GLUCOSE 112 10/11/2017 02:15 PM     PT/INR: No results found for: INR  A1C: No results found for: Sanjana Loving MD, 12/12/2022 , 3:51 PM

## 2023-01-05 ENCOUNTER — NURSE ONLY (OUTPATIENT)
Dept: INTERNAL MEDICINE CLINIC | Age: 48
End: 2023-01-05
Payer: COMMERCIAL

## 2023-01-05 DIAGNOSIS — R53.82 CHRONIC FATIGUE: Primary | ICD-10-CM

## 2023-01-05 LAB — VITAMIN D 25-HYDROXY: 45.5 NG/ML

## 2023-01-05 PROCEDURE — 36415 COLL VENOUS BLD VENIPUNCTURE: CPT | Performed by: INTERNAL MEDICINE

## 2023-01-09 ENCOUNTER — OFFICE VISIT (OUTPATIENT)
Dept: INTERNAL MEDICINE CLINIC | Age: 48
End: 2023-01-09
Payer: COMMERCIAL

## 2023-01-09 ENCOUNTER — HOSPITAL ENCOUNTER (OUTPATIENT)
Age: 48
Discharge: HOME | End: 2023-01-09
Payer: COMMERCIAL

## 2023-01-09 VITALS
BODY MASS INDEX: 27.07 KG/M2 | HEART RATE: 80 BPM | RESPIRATION RATE: 16 BRPM | OXYGEN SATURATION: 98 % | WEIGHT: 148 LBS | TEMPERATURE: 97.8 F

## 2023-01-09 DIAGNOSIS — N39.46 MIXED INCONTINENCE: Primary | ICD-10-CM

## 2023-01-09 DIAGNOSIS — N31.9 BLADDER DYSFUNCTION: ICD-10-CM

## 2023-01-09 DIAGNOSIS — Z12.11 SCREEN FOR COLON CANCER: ICD-10-CM

## 2023-01-09 DIAGNOSIS — R07.89 OTHER CHEST PAIN: ICD-10-CM

## 2023-01-09 DIAGNOSIS — R53.82 CHRONIC FATIGUE: ICD-10-CM

## 2023-01-09 DIAGNOSIS — E78.2 MIXED HYPERLIPIDEMIA: ICD-10-CM

## 2023-01-09 LAB — D DIMER: <200 NG/ML(DDU)

## 2023-01-09 PROCEDURE — 85379 FIBRIN DEGRADATION QUANT: CPT

## 2023-01-09 PROCEDURE — 36415 COLL VENOUS BLD VENIPUNCTURE: CPT

## 2023-01-09 PROCEDURE — 99213 OFFICE O/P EST LOW 20 MIN: CPT | Performed by: INTERNAL MEDICINE

## 2023-01-09 PROCEDURE — 93000 ELECTROCARDIOGRAM COMPLETE: CPT | Performed by: INTERNAL MEDICINE

## 2023-01-09 RX ORDER — OXYBUTYNIN CHLORIDE 10 MG/1
10 TABLET, EXTENDED RELEASE ORAL DAILY
Qty: 30 TABLET | Refills: 2 | Status: SHIPPED | OUTPATIENT
Start: 2023-01-09

## 2023-01-09 RX ORDER — ATORVASTATIN CALCIUM 40 MG/1
TABLET, FILM COATED ORAL
Qty: 30 TABLET | Refills: 5 | Status: SHIPPED | OUTPATIENT
Start: 2023-01-09

## 2023-01-09 ASSESSMENT — PATIENT HEALTH QUESTIONNAIRE - PHQ9
2. FEELING DOWN, DEPRESSED OR HOPELESS: 0
SUM OF ALL RESPONSES TO PHQ9 QUESTIONS 1 & 2: 0
SUM OF ALL RESPONSES TO PHQ QUESTIONS 1-9: 0
1. LITTLE INTEREST OR PLEASURE IN DOING THINGS: 0
SUM OF ALL RESPONSES TO PHQ QUESTIONS 1-9: 0

## 2023-01-09 NOTE — PROGRESS NOTES
Penelope Hurtado  Patient's  is 1975  Seen in office on 2023      SUBJECTIVE:  Kenn adams 50 y. o.year old female presents today   Chief Complaint   Patient presents with    Medication Refill    1 Month Follow-Up     Lab review         Patient is here for follow-up of hyperlipidemia, chest pain and to review the labs  Patient states she does have migraine headaches that happen during the menstrual cycle. Takes Fioricet and that helps. She had a CT scan of the head several years ago that was normal.  Headaches have been there for many years. Patient has hyperlipidemia and is taking atorvastatin 40 mg daily. She had the blood test done. Lipid profile has improved. She denies any side effects. No myalgias. No abdominal pain no nausea or vomiting. Patient states she is having chest pain today last only 2 to 3 seconds on the left side of the chest lateral to upper sternum. No relationship to cough. No pain on deep breaths. After few seconds and resolves and comes back again after several minutes. She is able to ambulate. No shortness of breath on exertion. No history of blood clot. Patient is ambulatory without any problems    Patient has urinary incontinence and is taking oxybutynin 10 mg daily without much help. She has a problems controlling the bladder since young age and has not seen any urologist.      Taking medications regularly. No side effects noted. Review of Systems  Review of system normal except as in HPI  OBJECTIVE: Pulse 80   Temp 97.8 °F (36.6 °C) (Temporal)   Resp 16   Wt 148 lb (67.1 kg)   LMP 2022 (Exact Date)   SpO2 98%   BMI 27.07 kg/m²     Wt Readings from Last 3 Encounters:   23 148 lb (67.1 kg)   22 147 lb (66.7 kg)   22 132 lb 3.2 oz (60 kg)      GENERAL: - Alert, oriented, pleasant, in no apparent distress. HEENT: - Conjunctiva pink, no scleral icterus. ENT clear. NECK: -Supple. No jugular venous distention noted.  No masses felt,  CARDIOVASCULAR: - Normal S1 and S2. No pericardial rub  PULMONARY: - No respiratory distress. No wheezes or rales. No pleural rub  ABDOMEN: - Soft and non-tender,no masses  ororganomegaly. EXTREMITIES: - No cyanosis, clubbing, or significant edema. SKIN: Skin is warm and dry. NEUROLOGICAL: - Cranial nerves II through XII are grossly intact. IMPRESSION:    Encounter Diagnoses   Name Primary? Mixed incontinence Yes    Mixed hyperlipidemia     Screen for colon cancer     Chronic fatigue     Other chest pain     Bladder dysfunction        ASSESSMENT/PLAN:        1. Mixed incontinence  Overview:  Patient is taking oxybutynin ER 10 mg daily  Refer patient to urologist Dr. Lynne Wiseman as the medicine is not helping. Orders:  -     Mahsa Tiwari MD, Urology, [place]  2. Mixed hyperlipidemia  Overview:  Pt has HLD since teenage   Chol 312 recently   On atorvastatin 40 mg daily   Lipid profile improved. Follow low-cholesterol diet and atorvastatin 40 mg daily     3. Screen for colon cancer  -     Fecal DNA Colorectal cancer screening (Cologuard)  Patient agreed  4. Chronic fatigue  Overview:  Fatigue for 3-4 years   Blood test look normal.  TSH is normal CBC is normal  Advised patient to start doing exercise. 5. Other chest pain  Overview:  Patient having pleuritic type chest pain. Will get D-dimer and a chest x-ray  Orders:  -     EKG 12 Lead; Future. EKG is normal  -     D-Dimer, Quantitative; Future  6. Bladder dysfunction  -     oxybutynin (DITROPAN-XL) 10 MG extended release tablet; Take 1 tablet by mouth daily, Disp-30 tablet, R-2Normal    Return to office in 2 months. If the symptoms get worse call    Mediations reviewed with the patient. Continue current medications. Appropriate prescriptions are addressed. After visit summeryprovided. Follow up as directed sooner if needed. Questions answered and patient verbalizes understanding.  Call for any problems, questions, or concerns. No Known Allergies  Current Outpatient Medications   Medication Sig Dispense Refill    butalbital-APAP-caffeine -40 MG CAPS per capsule       desogestrel-ethinyl estradiol (VOLNEA) 0.15-0.02/0.01 MG (21/5) per tablet Take 1 tablet by mouth daily 3 packet 3    atorvastatin (LIPITOR) 40 MG tablet TAKE ONE TABLET BY MOUTH DAILY 30 tablet 3    oxybutynin (DITROPAN-XL) 5 MG extended release tablet TAKE TWO TABLETS BY MOUTH DAILY 60 tablet 0     No current facility-administered medications for this visit.      Past Medical History:   Diagnosis Date    Abnormal Pap smear of cervix     LGSIL 3/17/2020, had cryo 3/21/20    Broken nose     Headache     migraine    Urinary incontinence      Past Surgical History:   Procedure Laterality Date    GYNECOLOGIC CRYOSURGERY      NASAL SEPTUM SURGERY       Social History     Tobacco Use    Smoking status: Never    Smokeless tobacco: Never   Substance Use Topics    Alcohol use: No       LAB REVIEW:  CBC:   Lab Results   Component Value Date/Time    WBC 5.5 01/05/2023 08:48 AM    HGB 14.2 01/05/2023 08:48 AM    HCT 42.7 01/05/2023 08:48 AM     01/05/2023 08:48 AM     Lipids:   Lab Results   Component Value Date    HDL 69 (H) 01/05/2023    LDLCALC 114 (H) 01/05/2023    TRIGLYCFAST 185 (H) 01/05/2023    CHOLFAST 220 (H) 01/05/2023     Renal:   Lab Results   Component Value Date/Time    BUN 14 01/05/2023 08:48 AM    CREATININE 0.7 01/05/2023 08:48 AM     01/05/2023 08:48 AM    K 4.3 01/05/2023 08:48 AM    ALT 28 01/05/2023 08:48 AM    AST 31 01/05/2023 08:48 AM    GLUCOSE 92 01/05/2023 08:48 AM     PT/INR: No results found for: INR  A1C: No results found for: Jerica Benitez MD, 1/9/2023 , 2:59 PM

## 2023-03-14 DIAGNOSIS — N31.9 BLADDER DYSFUNCTION: ICD-10-CM

## 2023-03-14 RX ORDER — OXYBUTYNIN CHLORIDE 10 MG/1
10 TABLET, EXTENDED RELEASE ORAL DAILY
Qty: 30 TABLET | Refills: 2 | Status: SHIPPED | OUTPATIENT
Start: 2023-03-14

## 2023-03-22 ENCOUNTER — TELEPHONE (OUTPATIENT)
Dept: INTERNAL MEDICINE CLINIC | Age: 48
End: 2023-03-22

## 2023-03-24 ENCOUNTER — OFFICE VISIT (OUTPATIENT)
Dept: INTERNAL MEDICINE CLINIC | Age: 48
End: 2023-03-24
Payer: COMMERCIAL

## 2023-03-24 VITALS
TEMPERATURE: 97.9 F | BODY MASS INDEX: 27.25 KG/M2 | HEART RATE: 76 BPM | SYSTOLIC BLOOD PRESSURE: 116 MMHG | WEIGHT: 149 LBS | OXYGEN SATURATION: 99 % | RESPIRATION RATE: 16 BRPM | DIASTOLIC BLOOD PRESSURE: 72 MMHG

## 2023-03-24 DIAGNOSIS — E78.2 MIXED HYPERLIPIDEMIA: Primary | ICD-10-CM

## 2023-03-24 DIAGNOSIS — N39.46 MIXED INCONTINENCE: ICD-10-CM

## 2023-03-24 DIAGNOSIS — G43.839 INTRACTABLE MENSTRUAL MIGRAINE WITHOUT STATUS MIGRAINOSUS: ICD-10-CM

## 2023-03-24 DIAGNOSIS — R07.89 OTHER CHEST PAIN: ICD-10-CM

## 2023-03-24 PROCEDURE — 99213 OFFICE O/P EST LOW 20 MIN: CPT | Performed by: INTERNAL MEDICINE

## 2023-03-24 ASSESSMENT — PATIENT HEALTH QUESTIONNAIRE - PHQ9
SUM OF ALL RESPONSES TO PHQ QUESTIONS 1-9: 0
1. LITTLE INTEREST OR PLEASURE IN DOING THINGS: 0
SUM OF ALL RESPONSES TO PHQ QUESTIONS 1-9: 0
2. FEELING DOWN, DEPRESSED OR HOPELESS: 0
SUM OF ALL RESPONSES TO PHQ QUESTIONS 1-9: 0
SUM OF ALL RESPONSES TO PHQ QUESTIONS 1-9: 0
SUM OF ALL RESPONSES TO PHQ9 QUESTIONS 1 & 2: 0

## 2023-03-24 ASSESSMENT — ENCOUNTER SYMPTOMS
GASTROINTESTINAL NEGATIVE: 1
EYES NEGATIVE: 1
RESPIRATORY NEGATIVE: 1

## 2023-03-24 NOTE — PROGRESS NOTES
or rales. ABDOMEN: - Soft and non-tender,no masses  ororganomegaly. EXTREMITIES: - No cyanosis, clubbing, or significant edema. SKIN: Skin is warm and dry. NEUROLOGICAL: - Cranial nerves II through XII are grossly intact. IMPRESSION:    Encounter Diagnoses   Name Primary? Mixed hyperlipidemia Yes    Intractable menstrual migraine without status migrainosus     Other chest pain     Mixed incontinence        ASSESSMENT/PLAN:    Suggested patient try double voiding. Will trial Motrin 400 mg for headaches with 500 mg Tylenol for migraines. Take with food. Will let us know how this works. Will refill prescriptions for mirabegron. Recheck lipids in 3 months. For hyperlipidemia. Continue atorvastatin 40 mg daily  Urinary incontinence. Will renew Myrbetriq  Discussed with gynecologist about getting off of birth control pills. Patient denies hot flashes etc.    Orders Placed This Encounter   Procedures    Lipid, Fasting    Comprehensive Metabolic Panel         Mediations reviewed with the patient. Continue current medications. Appropriate prescriptions are addressed. After visit summeryprovided. Follow up as directed sooner if needed. Questions answered and patient verbalizes understanding. Call for any problems, questions, or concerns. No Known Allergies  Current Outpatient Medications   Medication Sig Dispense Refill    oxybutynin (DITROPAN-XL) 10 MG extended release tablet TAKE 1 TABLET BY MOUTH DAILY 30 tablet 2    atorvastatin (LIPITOR) 40 MG tablet TAKE ONE TABLET BY MOUTH DAILY 30 tablet 5    butalbital-APAP-caffeine -40 MG CAPS per capsule       desogestrel-ethinyl estradiol (VOLNEA) 0.15-0.02/0.01 MG (21/5) per tablet Take 1 tablet by mouth daily 3 packet 3     No current facility-administered medications for this visit.      Past Medical History:   Diagnosis Date    Abnormal Pap smear of cervix     LGSIL 3/17/2020, had cryo 3/21/20    Broken nose     Headache     migraine

## 2023-06-19 ENCOUNTER — NURSE ONLY (OUTPATIENT)
Dept: INTERNAL MEDICINE CLINIC | Age: 48
End: 2023-06-19

## 2023-06-19 ENCOUNTER — HOSPITAL ENCOUNTER (OUTPATIENT)
Age: 48
Discharge: HOME | End: 2023-06-19
Payer: COMMERCIAL

## 2023-06-19 DIAGNOSIS — E78.2 MIXED HYPERLIPIDEMIA: Primary | ICD-10-CM

## 2023-06-19 LAB
ALBUMIN SERPL-MCNC: 4.2 GM/DL (ref 3.4–5)
ALP BLD-CCNC: 71 IU/L (ref 40–129)
ALT SERPL-CCNC: 15 U/L (ref 10–40)
ANION GAP SERPL CALCULATED.3IONS-SCNC: 7 MMOL/L (ref 4–16)
AST SERPL-CCNC: 15 IU/L (ref 15–37)
BILIRUB SERPL-MCNC: 0.5 MG/DL (ref 0–1)
BUN SERPL-MCNC: 9 MG/DL (ref 6–23)
CALCIUM SERPL-MCNC: 9.5 MG/DL (ref 8.3–10.6)
CHLORIDE BLD-SCNC: 106 MMOL/L (ref 99–110)
CHOLESTEROL, FASTING: 201 MG/DL
CO2: 26 MMOL/L (ref 21–32)
CREAT SERPL-MCNC: 0.6 MG/DL (ref 0.6–1.1)
D DIMER: <200 NG/ML(DDU)
GFR SERPL CREATININE-BSD FRML MDRD: >60 ML/MIN/1.73M2
GLUCOSE P FAST SERPL-MCNC: 95 MG/DL (ref 70–99)
HDLC SERPL-MCNC: 79 MG/DL
LDLC SERPL CALC-MCNC: 103 MG/DL
POTASSIUM SERPL-SCNC: 4.9 MMOL/L (ref 3.5–5.1)
SODIUM BLD-SCNC: 139 MMOL/L (ref 135–145)
TOTAL PROTEIN: 7.3 GM/DL (ref 6.4–8.2)
TRIGLYCERIDE, FASTING: 95 MG/DL

## 2023-06-19 PROCEDURE — 80053 COMPREHEN METABOLIC PANEL: CPT

## 2023-06-19 PROCEDURE — 80061 LIPID PANEL: CPT

## 2023-06-19 PROCEDURE — 36415 COLL VENOUS BLD VENIPUNCTURE: CPT

## 2023-06-19 PROCEDURE — 85379 FIBRIN DEGRADATION QUANT: CPT

## 2023-06-27 ENCOUNTER — TELEPHONE (OUTPATIENT)
Dept: INTERNAL MEDICINE CLINIC | Age: 48
End: 2023-06-27

## 2023-07-12 RX ORDER — ATORVASTATIN CALCIUM 40 MG/1
TABLET, FILM COATED ORAL
Qty: 30 TABLET | Refills: 5 | OUTPATIENT
Start: 2023-07-12

## 2023-08-03 ENCOUNTER — OFFICE VISIT (OUTPATIENT)
Dept: INTERNAL MEDICINE CLINIC | Age: 48
End: 2023-08-03
Payer: COMMERCIAL

## 2023-08-03 VITALS
SYSTOLIC BLOOD PRESSURE: 120 MMHG | HEART RATE: 79 BPM | WEIGHT: 145 LBS | HEIGHT: 62 IN | BODY MASS INDEX: 26.68 KG/M2 | OXYGEN SATURATION: 97 % | RESPIRATION RATE: 20 BRPM | DIASTOLIC BLOOD PRESSURE: 84 MMHG

## 2023-08-03 DIAGNOSIS — R14.0 ABDOMINAL BLOATING: Primary | ICD-10-CM

## 2023-08-03 DIAGNOSIS — Z23 NEED FOR ZOSTER VACCINE: ICD-10-CM

## 2023-08-03 DIAGNOSIS — Z11.59 NEED FOR HEPATITIS C SCREENING TEST: ICD-10-CM

## 2023-08-03 DIAGNOSIS — K63.89 SMALL INTESTINAL BACTERIAL OVERGROWTH (SIBO): ICD-10-CM

## 2023-08-03 DIAGNOSIS — Z11.4 ENCOUNTER FOR SCREENING FOR HIV: ICD-10-CM

## 2023-08-03 PROCEDURE — 99214 OFFICE O/P EST MOD 30 MIN: CPT | Performed by: GENERAL PRACTICE

## 2023-08-03 RX ORDER — ZOSTER VACCINE RECOMBINANT, ADJUVANTED 50 MCG/0.5
0.5 KIT INTRAMUSCULAR SEE ADMIN INSTRUCTIONS
Qty: 0.5 ML | Refills: 0 | Status: SHIPPED | OUTPATIENT
Start: 2023-08-03 | End: 2023-08-04

## 2023-08-03 RX ORDER — ATORVASTATIN CALCIUM 40 MG/1
TABLET, FILM COATED ORAL
Qty: 30 TABLET | Refills: 5 | Status: SHIPPED | OUTPATIENT
Start: 2023-08-03

## 2023-08-03 SDOH — ECONOMIC STABILITY: FOOD INSECURITY: WITHIN THE PAST 12 MONTHS, YOU WORRIED THAT YOUR FOOD WOULD RUN OUT BEFORE YOU GOT MONEY TO BUY MORE.: NEVER TRUE

## 2023-08-03 SDOH — ECONOMIC STABILITY: FOOD INSECURITY: WITHIN THE PAST 12 MONTHS, THE FOOD YOU BOUGHT JUST DIDN'T LAST AND YOU DIDN'T HAVE MONEY TO GET MORE.: NEVER TRUE

## 2023-08-03 SDOH — ECONOMIC STABILITY: INCOME INSECURITY: HOW HARD IS IT FOR YOU TO PAY FOR THE VERY BASICS LIKE FOOD, HOUSING, MEDICAL CARE, AND HEATING?: NOT VERY HARD

## 2023-08-03 SDOH — ECONOMIC STABILITY: HOUSING INSECURITY
IN THE LAST 12 MONTHS, WAS THERE A TIME WHEN YOU DID NOT HAVE A STEADY PLACE TO SLEEP OR SLEPT IN A SHELTER (INCLUDING NOW)?: NO

## 2023-08-03 ASSESSMENT — ENCOUNTER SYMPTOMS
BLOOD IN STOOL: 0
ABDOMINAL PAIN: 1
CONSTIPATION: 0
DIARRHEA: 0
NAUSEA: 0
VOMITING: 0

## 2023-08-03 NOTE — PROGRESS NOTES
Ayden Myers (:  1975) is a 46 y.o. female,Established patient, here for evaluation of the following chief complaint(s):  No chief complaint on file. ASSESSMENT/PLAN:  1. Encounter for screening for HIV  ordered  - HIV Screen; Future    2. Need for hepatitis C screening test  ordered  - Hepatitis C Antibody; Future    3. Need for zoster vaccine  ordered  - zoster recombinant adjuvanted vaccine [facility]) 50 MCG/0.5ML SUSR injection; Inject 0.5 mLs into the muscle See Admin Instructions for 1 day  Dispense: 0.5 mL; Refill: 0      4. Abdominal bloating  Pain RUQ, check KUB for gas pattern. Check RUQ US to check for Gallstones. No evidence of hayward's sign or Hepatomegaly. If neg can consider CT A/p.  - XR ABDOMEN (KUB) (SINGLE AP VIEW); Future  - US GALLBLADDER RUQ; Future    5. Small intestinal bacterial overgrowth (SIBO)  Recommend rifaxamin TID x14 days, with pro-biotics. If bloating continues can consider FODMAP elimination and re-introduction of foods assoc with gas production. - rifAXIMin (XIFAXAN) 550 MG tablet; Take 1 tablet by mouth 3 times daily for 14 days  Dispense: 42 tablet; Refill: 0    6. Hyperlipidemia  --refilled   atorvastatin (LIPITOR) 40 MG tablet; TAKE ONE TABLET BY MOUTH DAILY  Dispense: 30 tablet; Refill: 5    Return in about 6 weeks (around 2023) for Interval follow-up. Subjective   SUBJECTIVE/OBJECTIVE:  HPI  No chief complaint on file. PMHx  HLP  Overwight (BMI 26)      Patient presents today with long standing bloating and abd discomfort. Notes significant gas/flatus production after meals. Was seen by GI and placed on pro-biotics years ago but did not go back as felt it wasn't helping. New RUQ pain, no assoc fevers, chills, new workouts. When laying on left side pain is more pronounced. Has not noticed any skin discoloration, stool change, or LE swelling. Denies rashes, pruritis.      Review of Systems   Constitutional:  Negative for activity

## 2023-08-03 NOTE — PROGRESS NOTES
Patient having right lower abdominal pain. She cannot pinpoint pain. When she lays on left side it feels worse.

## 2023-08-14 DIAGNOSIS — Z87.42 HISTORY OF DYSMENORRHEA: ICD-10-CM

## 2023-08-14 RX ORDER — DESOGESTREL AND ETHINYL ESTRADIOL 21-5 (28)
KIT ORAL
Qty: 84 TABLET | Refills: 2 | OUTPATIENT
Start: 2023-08-14

## 2023-08-16 ENCOUNTER — HOSPITAL ENCOUNTER (OUTPATIENT)
Age: 48
Discharge: HOME | End: 2023-08-16
Attending: GENERAL PRACTICE
Payer: COMMERCIAL

## 2023-08-16 ENCOUNTER — HOSPITAL ENCOUNTER (OUTPATIENT)
Dept: GENERAL RADIOLOGY | Age: 48
Discharge: HOME | End: 2023-08-16
Attending: GENERAL PRACTICE
Payer: COMMERCIAL

## 2023-08-16 ENCOUNTER — HOSPITAL ENCOUNTER (OUTPATIENT)
Dept: ULTRASOUND IMAGING | Age: 48
Discharge: HOME | End: 2023-08-16
Attending: GENERAL PRACTICE
Payer: COMMERCIAL

## 2023-08-16 DIAGNOSIS — R14.0 ABDOMINAL BLOATING: ICD-10-CM

## 2023-08-16 PROCEDURE — 76705 ECHO EXAM OF ABDOMEN: CPT

## 2023-08-16 PROCEDURE — 74018 RADEX ABDOMEN 1 VIEW: CPT

## 2023-08-25 ENCOUNTER — TELEPHONE (OUTPATIENT)
Dept: INTERNAL MEDICINE CLINIC | Age: 48
End: 2023-08-25

## 2023-08-25 NOTE — TELEPHONE ENCOUNTER
----- Message from Aurelio Collins MD sent at 8/25/2023 10:20 AM EDT -----  I suggest weaning the foods in the below categories. We will slowly re-introduce and assess if certain foods cause your discomfort. [email or web address]        ----- Message -----  From: Maribell Avendano MA  Sent: 8/24/2023   5:21 PM EDT  To: Aurelio Collins MD    Patient is still having the side pain and bloating. She wonders if she should be gluten free.

## 2023-08-25 NOTE — TELEPHONE ENCOUNTER
----- Message from Mauricio Avina MD sent at 8/25/2023 10:20 AM EDT -----  I suggest weaning the foods in the below categories. We will slowly re-introduce and assess if certain foods cause your discomfort. [email or web address]        ----- Message -----  From: Yoko Fernandez MA  Sent: 8/24/2023   5:21 PM EDT  To: Mauricio vAina MD    Patient is still having the side pain and bloating. She wonders if she should be gluten free.

## 2023-10-06 ENCOUNTER — TELEPHONE (OUTPATIENT)
Dept: INTERNAL MEDICINE CLINIC | Age: 48
End: 2023-10-06

## 2023-10-06 NOTE — TELEPHONE ENCOUNTER
Pt called stating her right side pain is unbearable. Pt states her US showed a fatty liver but thinks it is something else due to the pain she is having.  Pt would like a call back [phone]

## 2023-11-06 RX ORDER — DESOGESTREL AND ETHINYL ESTRADIOL 21-5 (28)
1 KIT ORAL DAILY
Qty: 1 PACKET | Refills: 0 | Status: SHIPPED | OUTPATIENT
Start: 2023-11-06

## 2023-11-27 ENCOUNTER — OFFICE VISIT (OUTPATIENT)
Dept: OBGYN | Age: 48
End: 2023-11-27
Payer: COMMERCIAL

## 2023-11-27 ENCOUNTER — HOSPITAL ENCOUNTER (OUTPATIENT)
Age: 48
Setting detail: SPECIMEN
Discharge: HOME | End: 2023-11-27
Payer: COMMERCIAL

## 2023-11-27 VITALS
BODY MASS INDEX: 26.87 KG/M2 | HEART RATE: 73 BPM | WEIGHT: 146 LBS | DIASTOLIC BLOOD PRESSURE: 76 MMHG | SYSTOLIC BLOOD PRESSURE: 116 MMHG | HEIGHT: 62 IN

## 2023-11-27 DIAGNOSIS — Z12.31 BREAST CANCER SCREENING BY MAMMOGRAM: ICD-10-CM

## 2023-11-27 DIAGNOSIS — Z01.419 WOMEN'S ANNUAL ROUTINE GYNECOLOGICAL EXAMINATION: Primary | ICD-10-CM

## 2023-11-27 DIAGNOSIS — Z87.42 HISTORY OF DYSMENORRHEA: ICD-10-CM

## 2023-11-27 DIAGNOSIS — Z80.41 FAMILY HISTORY OF OVARIAN CANCER: ICD-10-CM

## 2023-11-27 PROCEDURE — 87624 HPV HI-RISK TYP POOLED RSLT: CPT

## 2023-11-27 PROCEDURE — 99396 PREV VISIT EST AGE 40-64: CPT | Performed by: NURSE PRACTITIONER

## 2023-11-27 PROCEDURE — 88142 CYTOPATH C/V THIN LAYER: CPT

## 2023-11-27 RX ORDER — DESOGESTREL AND ETHINYL ESTRADIOL 21-5 (28)
1 KIT ORAL DAILY
Qty: 3 PACKET | Refills: 3 | Status: SHIPPED | OUTPATIENT
Start: 2023-11-27

## 2023-11-27 ASSESSMENT — ENCOUNTER SYMPTOMS
NAUSEA: 0
VOMITING: 0
SHORTNESS OF BREATH: 0
CONSTIPATION: 0
ABDOMINAL PAIN: 0
RESPIRATORY NEGATIVE: 1
GASTROINTESTINAL NEGATIVE: 1
DIARRHEA: 0

## 2023-11-27 NOTE — PROGRESS NOTES
23    Louisa Cheney  1975    Chief Complaint   Patient presents with    Gynecologic Exam     Pt here for annual, is not sexually active, regular menses, LMP-10/30/2023, bc-ocp, pap-9/3/2021-neg, mammo- unsure, dexa-never, colonoscopy- colagard - . No c/o today. The patient is a 46 y.o. female, Amy Le who presents for her annual exam.  She is still menstruating. Her LMP was @LMP@. She is currently taking birth control. Birth control method is oral contraceptive. She is  sexually active. She reports no gynecological symptoms. Pap smear history: Her last PAP smear was in . Her results were normal.    Breast history: her most recent mammogram was in unsure. The results were: Normal    Osteoporosis Status: she has not had a bone density scan    Colonoscopy Status: She has not had a colonoscopy in the past.    Past Medical History:   Diagnosis Date    Abnormal Pap smear of cervix     LGSIL 3/17/2020, had cryo 3/21/20    Broken nose     Headache     migraine    Urinary incontinence        Past Surgical History:   Procedure Laterality Date    GYNECOLOGIC CRYOSURGERY      NASAL SEPTUM SURGERY         Family History   Problem Relation Age of Onset    Cancer Mother     Ovarian Cancer Mother     Heart Disease Father     High Cholesterol Father        Social History     Tobacco Use    Smoking status: Never    Smokeless tobacco: Never   Vaping Use    Vaping Use: Never used   Substance Use Topics    Alcohol use: No    Drug use: No       Current Outpatient Medications   Medication Sig Dispense Refill    desogestrel-ethinyl estradiol (VOLNEA) 0.15-0.02/0.01 MG () per tablet Take 1 tablet by mouth daily 3 packet 3    atorvastatin (LIPITOR) 40 MG tablet TAKE ONE TABLET BY MOUTH DAILY 30 tablet 5     No current facility-administered medications for this visit.        No Known Allergies        Immunization History   Administered Date(s) Administered    TDaP, ADACEL (age 4y-58y),

## 2023-12-01 LAB
HPV HIGH RISK: NOT DETECTED
HPV, GENOTYPE 16: NOT DETECTED
HPV, GENOTYPE 18: NOT DETECTED

## 2024-01-11 DIAGNOSIS — Z23 NEED FOR ZOSTER VACCINE: ICD-10-CM

## 2024-01-11 NOTE — TELEPHONE ENCOUNTER
Medication:   Requested Prescriptions     Pending Prescriptions Disp Refills    atorvastatin (LIPITOR) 40 MG tablet 30 tablet 5     Sig: TAKE ONE TABLET BY MOUTH DAILY        Last Filled:  08/03/2024    Patient Phone Number: [phone] (home)     Last appt: 8/3/2023   Next appt: Visit date not found    Last OARRS:        No data to display

## 2024-01-12 RX ORDER — ATORVASTATIN CALCIUM 40 MG/1
TABLET, FILM COATED ORAL
Qty: 30 TABLET | Refills: 5 | Status: SHIPPED | OUTPATIENT
Start: 2024-01-12

## 2024-07-24 ENCOUNTER — HOSPITAL ENCOUNTER (OUTPATIENT)
Dept: MAMMOGRAPHY | Age: 49
Discharge: HOME | End: 2024-07-24
Payer: COMMERCIAL

## 2024-07-24 VITALS — WEIGHT: 141 LBS | HEIGHT: 62 IN | BODY MASS INDEX: 25.95 KG/M2

## 2024-07-24 DIAGNOSIS — Z12.31 BREAST CANCER SCREENING BY MAMMOGRAM: ICD-10-CM

## 2024-07-24 PROCEDURE — 77063 BREAST TOMOSYNTHESIS BI: CPT

## 2024-08-07 DIAGNOSIS — Z23 NEED FOR ZOSTER VACCINE: ICD-10-CM

## 2024-08-07 RX ORDER — ATORVASTATIN CALCIUM 40 MG/1
TABLET, FILM COATED ORAL
Qty: 30 TABLET | Refills: 5 | Status: SHIPPED | OUTPATIENT
Start: 2024-08-07

## 2024-09-08 ENCOUNTER — TELEPHONE (OUTPATIENT)
Age: 49
End: 2024-09-08

## 2024-10-02 DIAGNOSIS — Z87.42 HISTORY OF DYSMENORRHEA: ICD-10-CM

## 2024-10-03 RX ORDER — DESOGESTREL AND ETHINYL ESTRADIOL 21-5 (28)
1 KIT ORAL DAILY
Qty: 84 TABLET | Refills: 3 | OUTPATIENT
Start: 2024-10-03

## 2024-10-07 DIAGNOSIS — Z87.42 HISTORY OF DYSMENORRHEA: ICD-10-CM

## 2024-10-07 RX ORDER — DESOGESTREL AND ETHINYL ESTRADIOL 21-5 (28)
1 KIT ORAL DAILY
Qty: 3 PACKET | Refills: 0 | Status: SHIPPED | OUTPATIENT
Start: 2024-10-07

## 2024-12-19 ENCOUNTER — OFFICE VISIT (OUTPATIENT)
Dept: OBGYN | Age: 49
End: 2024-12-19
Payer: COMMERCIAL

## 2024-12-19 ENCOUNTER — HOSPITAL ENCOUNTER (OUTPATIENT)
Age: 49
Setting detail: SPECIMEN
Discharge: HOME | End: 2024-12-19

## 2024-12-19 VITALS
HEIGHT: 61 IN | BODY MASS INDEX: 26.24 KG/M2 | WEIGHT: 139 LBS | SYSTOLIC BLOOD PRESSURE: 111 MMHG | DIASTOLIC BLOOD PRESSURE: 74 MMHG

## 2024-12-19 DIAGNOSIS — R92.333 HETEROGENEOUSLY DENSE TISSUE OF BOTH BREASTS ON MAMMOGRAPHY: ICD-10-CM

## 2024-12-19 DIAGNOSIS — Z01.419 WOMEN'S ANNUAL ROUTINE GYNECOLOGICAL EXAMINATION: Primary | ICD-10-CM

## 2024-12-19 DIAGNOSIS — Z80.3 FAMILY HISTORY OF BREAST CANCER IN FEMALE: ICD-10-CM

## 2024-12-19 DIAGNOSIS — Z80.41 FAMILY HISTORY OF OVARIAN CANCER: ICD-10-CM

## 2024-12-19 DIAGNOSIS — Z87.42 HISTORY OF DYSMENORRHEA: ICD-10-CM

## 2024-12-19 PROCEDURE — 99396 PREV VISIT EST AGE 40-64: CPT | Performed by: NURSE PRACTITIONER

## 2024-12-19 RX ORDER — DESOGESTREL AND ETHINYL ESTRADIOL 21-5 (28)
1 KIT ORAL DAILY
Qty: 3 PACKET | Refills: 3 | Status: SHIPPED | OUTPATIENT
Start: 2024-12-19

## 2024-12-19 SDOH — ECONOMIC STABILITY: FOOD INSECURITY: WITHIN THE PAST 12 MONTHS, THE FOOD YOU BOUGHT JUST DIDN'T LAST AND YOU DIDN'T HAVE MONEY TO GET MORE.: NEVER TRUE

## 2024-12-19 SDOH — ECONOMIC STABILITY: FOOD INSECURITY: WITHIN THE PAST 12 MONTHS, YOU WORRIED THAT YOUR FOOD WOULD RUN OUT BEFORE YOU GOT MONEY TO BUY MORE.: NEVER TRUE

## 2024-12-19 SDOH — ECONOMIC STABILITY: INCOME INSECURITY: HOW HARD IS IT FOR YOU TO PAY FOR THE VERY BASICS LIKE FOOD, HOUSING, MEDICAL CARE, AND HEATING?: NOT HARD AT ALL

## 2024-12-19 ASSESSMENT — PATIENT HEALTH QUESTIONNAIRE - PHQ9
SUM OF ALL RESPONSES TO PHQ9 QUESTIONS 1 & 2: 0
SUM OF ALL RESPONSES TO PHQ QUESTIONS 1-9: 0
1. LITTLE INTEREST OR PLEASURE IN DOING THINGS: NOT AT ALL
SUM OF ALL RESPONSES TO PHQ QUESTIONS 1-9: 0
2. FEELING DOWN, DEPRESSED OR HOPELESS: NOT AT ALL

## 2024-12-19 ASSESSMENT — ENCOUNTER SYMPTOMS
ABDOMINAL PAIN: 0
SHORTNESS OF BREATH: 0
NAUSEA: 0
GASTROINTESTINAL NEGATIVE: 1
CONSTIPATION: 0
VOMITING: 0
RESPIRATORY NEGATIVE: 1
DIARRHEA: 0

## 2024-12-19 NOTE — PROGRESS NOTES
24    Zahraa Ruiz  1975    Chief Complaint   Patient presents with    Gynecologic Exam     Annual exam. Non-smoker. h/o dvt. Patient is still menstruating 4regular birth control type oral contraception. Patient is sexually active. Pap Smear was  2023 and results were negativeHPV negative. Patient had a recent mammogram 2024 which was negative for malignancy. Mammogram showed dense breasts, Pt questioning if she should get MRI, Patient has not had previous bone density scan. Cologuard  normal.  Patient has no complaints .  Pt questions if she should continue BC d/t age.         The patient is a 49 y.o. female,  who presents for her annual exam.  She is still menstruating. Her LMP was @LMP@.  She is currently taking birth control.  Birth control method is oral contraceptive.  She is  sexually active.    She reports no gynecological symptoms.      Pap smear history: Her last PAP smear was in .  Her results were normal.    Breast history: her most recent mammogram was in 2024.  The results were: Normal    Osteoporosis Status: she has not had a bone density scan    Colonoscopy Status: [email or web address].  The results were normal per patient    Past Medical History:   Diagnosis Date    Abnormal Pap smear of cervix     LGSIL 3/17/2020, had cryo 3/21/20    Broken nose     Headache     migraine    Heterogeneously dense tissue of both breasts on mammography     Urinary incontinence        Past Surgical History:   Procedure Laterality Date    GYNECOLOGIC CRYOSURGERY      NASAL SEPTUM SURGERY         Family History   Problem Relation Age of Onset    Cancer Mother     Ovarian Cancer Mother         40ish    Heart Disease Father     High Cholesterol Father     Breast Cancer Neg Hx        Social History     Tobacco Use    Smoking status: Never    Smokeless tobacco: Never   Vaping Use    Vaping status: Never Used   Substance Use Topics    Alcohol use: No    Drug use: No       Current

## 2024-12-23 DIAGNOSIS — Z23 NEED FOR ZOSTER VACCINE: ICD-10-CM

## 2024-12-23 RX ORDER — ATORVASTATIN CALCIUM 40 MG/1
TABLET, FILM COATED ORAL
Qty: 30 TABLET | Refills: 5 | OUTPATIENT
Start: 2024-12-23

## 2024-12-24 LAB
COMMENT: NORMAL
HPV OTHER HR TYPES: NOT DETECTED
HPV TYPE 16: NOT DETECTED
HPV TYPE 18: NOT DETECTED

## 2024-12-27 LAB — GYNECOLOGY CYTOLOGY REPORT: NORMAL

## 2025-02-17 DIAGNOSIS — Z23 NEED FOR ZOSTER VACCINE: ICD-10-CM

## 2025-02-17 RX ORDER — ATORVASTATIN CALCIUM 40 MG/1
TABLET, FILM COATED ORAL
Qty: 30 TABLET | Refills: 5 | Status: SHIPPED | OUTPATIENT
Start: 2025-02-17

## 2025-03-03 ENCOUNTER — OFFICE VISIT (OUTPATIENT)
Age: 50
End: 2025-03-03
Payer: COMMERCIAL

## 2025-03-03 ENCOUNTER — HOSPITAL ENCOUNTER (OUTPATIENT)
Age: 50
Discharge: HOME | End: 2025-03-03
Payer: COMMERCIAL

## 2025-03-03 VITALS
HEART RATE: 76 BPM | WEIGHT: 145 LBS | SYSTOLIC BLOOD PRESSURE: 118 MMHG | BODY MASS INDEX: 26.68 KG/M2 | RESPIRATION RATE: 20 BRPM | OXYGEN SATURATION: 99 % | DIASTOLIC BLOOD PRESSURE: 80 MMHG | HEIGHT: 62 IN

## 2025-03-03 DIAGNOSIS — Z00.00 ANNUAL PHYSICAL EXAM: Primary | ICD-10-CM

## 2025-03-03 DIAGNOSIS — K76.0 FATTY LIVER DISEASE, NONALCOHOLIC: ICD-10-CM

## 2025-03-03 DIAGNOSIS — M77.12 LATERAL EPICONDYLITIS OF LEFT ELBOW: ICD-10-CM

## 2025-03-03 DIAGNOSIS — E78.2 MIXED HYPERLIPIDEMIA: ICD-10-CM

## 2025-03-03 LAB
ALBUMIN SERPL-MCNC: 4.4 G/DL (ref 3.4–5)
ALBUMIN/GLOB SERPL: 1.5 {RATIO} (ref 1.1–2.2)
ALP SERPL-CCNC: 100 U/L (ref 40–129)
ALT SERPL-CCNC: 15 U/L (ref 10–40)
ANION GAP SERPL CALCULATED.3IONS-SCNC: 11 MMOL/L (ref 4–16)
AST SERPL-CCNC: 20 U/L (ref 15–37)
BILIRUB SERPL-MCNC: 0.6 MG/DL (ref 0–1)
BUN SERPL-MCNC: 11 MG/DL (ref 6–23)
CALCIUM SERPL-MCNC: 9.4 MG/DL (ref 8.3–10.6)
CHLORIDE SERPL-SCNC: 103 MMOL/L (ref 99–110)
CHOLEST SERPL-MCNC: 217 MG/DL (ref 125–199)
CO2 SERPL-SCNC: 25 MMOL/L (ref 21–32)
CREAT SERPL-MCNC: 0.5 MG/DL (ref 0.6–1.1)
GFR, ESTIMATED: >90 ML/MIN/1.73M2
GLUCOSE SERPL-MCNC: 86 MG/DL (ref 74–99)
HDLC SERPL-MCNC: 70 MG/DL
LDLC SERPL CALC-MCNC: 123 MG/DL
POTASSIUM SERPL-SCNC: 4.2 MMOL/L (ref 3.5–5.1)
PROT SERPL-MCNC: 7.3 G/DL (ref 6.4–8.2)
SODIUM SERPL-SCNC: 139 MMOL/L (ref 135–145)
TRIGL SERPL-MCNC: 120 MG/DL

## 2025-03-03 PROCEDURE — 36415 COLL VENOUS BLD VENIPUNCTURE: CPT

## 2025-03-03 PROCEDURE — 80053 COMPREHEN METABOLIC PANEL: CPT

## 2025-03-03 PROCEDURE — 99396 PREV VISIT EST AGE 40-64: CPT | Performed by: GENERAL PRACTICE

## 2025-03-03 PROCEDURE — 80061 LIPID PANEL: CPT

## 2025-03-03 RX ORDER — ROSUVASTATIN CALCIUM 40 MG/1
40 TABLET, COATED ORAL DAILY
Qty: 90 TABLET | Refills: 1 | Status: SHIPPED | OUTPATIENT
Start: 2025-03-03

## 2025-03-03 SDOH — ECONOMIC STABILITY: FOOD INSECURITY: WITHIN THE PAST 12 MONTHS, YOU WORRIED THAT YOUR FOOD WOULD RUN OUT BEFORE YOU GOT MONEY TO BUY MORE.: NEVER TRUE

## 2025-03-03 SDOH — ECONOMIC STABILITY: FOOD INSECURITY: WITHIN THE PAST 12 MONTHS, THE FOOD YOU BOUGHT JUST DIDN'T LAST AND YOU DIDN'T HAVE MONEY TO GET MORE.: NEVER TRUE

## 2025-03-03 ASSESSMENT — PATIENT HEALTH QUESTIONNAIRE - PHQ9
6. FEELING BAD ABOUT YOURSELF - OR THAT YOU ARE A FAILURE OR HAVE LET YOURSELF OR YOUR FAMILY DOWN: NOT AT ALL
7. TROUBLE CONCENTRATING ON THINGS, SUCH AS READING THE NEWSPAPER OR WATCHING TELEVISION: NOT AT ALL
SUM OF ALL RESPONSES TO PHQ QUESTIONS 1-9: 9
10. IF YOU CHECKED OFF ANY PROBLEMS, HOW DIFFICULT HAVE THESE PROBLEMS MADE IT FOR YOU TO DO YOUR WORK, TAKE CARE OF THINGS AT HOME, OR GET ALONG WITH OTHER PEOPLE: NOT DIFFICULT AT ALL
8. MOVING OR SPEAKING SO SLOWLY THAT OTHER PEOPLE COULD HAVE NOTICED. OR THE OPPOSITE, BEING SO FIGETY OR RESTLESS THAT YOU HAVE BEEN MOVING AROUND A LOT MORE THAN USUAL: NOT AT ALL
SUM OF ALL RESPONSES TO PHQ QUESTIONS 1-9: 9
3. TROUBLE FALLING OR STAYING ASLEEP: NEARLY EVERY DAY
4. FEELING TIRED OR HAVING LITTLE ENERGY: NEARLY EVERY DAY
SUM OF ALL RESPONSES TO PHQ QUESTIONS 1-9: 9
9. THOUGHTS THAT YOU WOULD BE BETTER OFF DEAD, OR OF HURTING YOURSELF: NOT AT ALL
SUM OF ALL RESPONSES TO PHQ QUESTIONS 1-9: 9
1. LITTLE INTEREST OR PLEASURE IN DOING THINGS: NOT AT ALL
2. FEELING DOWN, DEPRESSED OR HOPELESS: NEARLY EVERY DAY
5. POOR APPETITE OR OVEREATING: NOT AT ALL

## 2025-03-03 NOTE — PROGRESS NOTES
Zahraa Ruiz (:  1975) is a 49 y.o. female,Established patient, here for evaluation of the following chief complaint(s):  Annual Exam       Assessment & Plan   ASSESSMENT/PLAN:  1. Annual physical exam  Declines vaccinations    2. Mixed hyperlipidemia  Check lipids, d/c lipitor, start crestor. If myalgias persist lower dose to 20mg  - LIPID PANEL; Future  - rosuvastatin (CRESTOR) 40 MG tablet; Take 1 tablet by mouth daily  Dispense: 90 tablet; Refill: 1    3. Fatty liver disease, nonalcoholic  Check elastography, check CMP for liver enzymes.  - US ORGAN ELASTOGRAPHY; Future  - Comprehensive Metabolic Panel; Future    4. Lateral epicondylitis of left elbow  Recommend HEP and stretching    Return in about 8 weeks (around 2025) for Interval follow-up, Labs.         Subjective   SUBJECTIVE/OBJECTIVE:  HPI  No chief complaint on file.  PMHx  HLP  Overwight (BMI 26)    Declines vaccinations  History of Present Illness  The patient presents for evaluation of fatty liver disease, hypercholesterolemia, depression, and left arm pain.    She was diagnosed with fatty liver disease following an ultrasound performed due to persistent side pain. She has been experiencing intermittent side pain, which she suspects may be related to her liver condition. She has been taking milk thistle and turmeric, which have provided some relief. She does not consume alcohol. She has made dietary modifications, including the elimination of bread, resulting in a weight loss of 10 pounds. However, she regained the weight during the holiday season due to resumption of bread consumption. She is uncertain about the potential impact of her cholesterol medication on her liver condition.    She has been experiencing severe pain in her left arm for the past 4 weeks, initially attributing it to a strain. The pain is so intense that it disrupts her sleep and impedes her ability to grasp objects, such as items from the refrigerator. She

## 2025-03-03 NOTE — PROGRESS NOTES
Patient has been depressed   She is still having periods.     Patient concerned about left arm pain       Patient asking about lipitor and fatty liver on ultrasound.

## 2025-03-04 ENCOUNTER — TELEPHONE (OUTPATIENT)
Age: 50
End: 2025-03-04

## 2025-03-04 DIAGNOSIS — E78.2 MIXED HYPERLIPIDEMIA: Primary | ICD-10-CM

## 2025-03-04 NOTE — TELEPHONE ENCOUNTER
Pt called stating they bob labs during her visit and didn't realize they would be drawing labs so she had eaten breakfast at 6:00 am. Pt states she had a sweet tea and a sausage, egg, and cheese McMuffin. Wanted to let PCP know as they bob lipid panel    Please advise

## 2025-03-04 NOTE — TELEPHONE ENCOUNTER
Ma'am,     I reviewed your message again, 630 am to 330 pm is 9 hours and your cholesterol labs would be valid. I suggest with weight reduction, increasing exercise, and avoiding red meat/carbohydrates you will see the numbers improve. We can recheck in 3mo to see if has improved.    Dr. Patterson